# Patient Record
Sex: MALE | NOT HISPANIC OR LATINO | Employment: UNEMPLOYED | URBAN - METROPOLITAN AREA
[De-identification: names, ages, dates, MRNs, and addresses within clinical notes are randomized per-mention and may not be internally consistent; named-entity substitution may affect disease eponyms.]

---
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
## 2023-03-19 ENCOUNTER — OFFICE VISIT (OUTPATIENT)
Dept: URGENT CARE | Facility: URGENT CARE | Age: 18
End: 2023-03-19
Payer: COMMERCIAL

## 2023-03-19 VITALS
DIASTOLIC BLOOD PRESSURE: 80 MMHG | WEIGHT: 235 LBS | OXYGEN SATURATION: 97 % | HEART RATE: 85 BPM | SYSTOLIC BLOOD PRESSURE: 131 MMHG | TEMPERATURE: 98.3 F

## 2023-03-19 DIAGNOSIS — B27.90 INFECTIOUS MONONUCLEOSIS WITHOUT COMPLICATION, INFECTIOUS MONONUCLEOSIS DUE TO UNSPECIFIED ORGANISM: Primary | ICD-10-CM

## 2023-03-19 DIAGNOSIS — J03.90 EXUDATIVE TONSILLITIS: ICD-10-CM

## 2023-03-19 DIAGNOSIS — R07.0 THROAT PAIN: ICD-10-CM

## 2023-03-19 LAB
DEPRECATED S PYO AG THROAT QL EIA: NEGATIVE
MONOCYTES NFR BLD AUTO: POSITIVE %

## 2023-03-19 PROCEDURE — 36415 COLL VENOUS BLD VENIPUNCTURE: CPT | Performed by: INTERNAL MEDICINE

## 2023-03-19 PROCEDURE — 87651 STREP A DNA AMP PROBE: CPT | Performed by: INTERNAL MEDICINE

## 2023-03-19 PROCEDURE — 99204 OFFICE O/P NEW MOD 45 MIN: CPT | Performed by: INTERNAL MEDICINE

## 2023-03-19 PROCEDURE — 86308 HETEROPHILE ANTIBODY SCREEN: CPT | Performed by: INTERNAL MEDICINE

## 2023-03-19 ASSESSMENT — ENCOUNTER SYMPTOMS
NAUSEA: 1
FEVER: 0
DIAPHORESIS: 0
COUGH: 0
CHILLS: 0
RHINORRHEA: 0
MYALGIAS: 0
APPETITE CHANGE: 0

## 2023-03-19 NOTE — PROGRESS NOTES
ASSESSMENT AND PLAN:      ICD-10-CM    1. Infectious mononucleosis without complication, infectious mononucleosis due to unspecified organism  B27.90       2. Throat pain  R07.0 Streptococcus A Rapid Screen w/Reflex to PCR - Clinic Collect     Group A Streptococcus PCR Throat Swab      3. Exudative tonsillitis  J03.90 Mononucleosis screen     Mononucleosis screen          Differential Diagnosis: strep, mono, viral, bacterial    Serious Comorbid Conditions:  Adult:  None    PLAN:    Reviewed handout with patient  Patient Instructions       Fluids  rest    Component      Latest Ref Rng & Units 3/19/2023   Rapid Strep A Screen      Negative Negative   Mononucleosis Screen      Negative Positive (A)       Avoid contact sports for 2 months.    Follow-up with your primary clinic over the next few weeks and with any other concerns.              Batsheva Ochoa MD  [facility]    Subjective     Dewayne Jackson is a 17 year old who presents for Patient presents with:  Urgent Care  Pharyngitis: C/O sore throat for 1 week    a new patient of [facility].      Onset of symptoms was 1-2 week(s) ago.  Current and Associated symptoms: sore throat, hoarse voice and swollen areas in neck  headaches  Harder to inhale with sleeping  Treatment measures tried include gargling  Predisposing factors include ill contact: none.        Review of Systems   Constitutional: Negative for appetite change, chills, diaphoresis and fever.   HENT: Positive for congestion. Negative for rhinorrhea.    Respiratory: Negative for cough (with clearing throat).    Gastrointestinal: Positive for nausea.   Musculoskeletal: Negative for myalgias.           Objective    /80   Pulse 85   Temp 98.3  F (36.8  C) (Tympanic)   Wt 106.6 kg (235 lb)   SpO2 97%   Physical Exam  Vitals reviewed.   Constitutional:       Appearance: Normal appearance.   HENT:      Right Ear: Tympanic membrane normal.      Left Ear: Tympanic membrane  normal.      Mouth/Throat:      Pharynx: Oropharyngeal exudate and posterior oropharyngeal erythema present.   Eyes:      Conjunctiva/sclera: Conjunctivae normal.   Abdominal:      Palpations: Abdomen is soft.      Tenderness: There is no abdominal tenderness.      Comments: NO HSM   Lymphadenopathy:      Cervical: Cervical adenopathy present.   Neurological:      Mental Status: He is alert.            Results for orders placed or performed in visit on 03/19/23 (from the past 24 hour(s))   Streptococcus A Rapid Screen w/Reflex to PCR - Clinic Collect    Specimen: Throat; Swab   Result Value Ref Range    Group A Strep antigen Negative Negative   Mononucleosis screen   Result Value Ref Range    Mononucleosis Screen Positive (A) Negative

## 2023-03-19 NOTE — PATIENT INSTRUCTIONS
Fluids  rest    Component      Latest Ref Rng & Units 3/19/2023   Rapid Strep A Screen      Negative Negative   Mononucleosis Screen      Negative Positive (A)       Avoid contact sports for 2 months.    Follow-up with your primary clinic over the next few weeks and with any other concerns.

## 2023-03-20 LAB — GROUP A STREP BY PCR: NOT DETECTED

## 2025-02-20 ENCOUNTER — HOSPITAL ENCOUNTER (EMERGENCY)
Facility: CLINIC | Age: 20
Discharge: HOME | End: 2025-02-20
Attending: EMERGENCY MEDICINE | Admitting: EMERGENCY MEDICINE
Payer: COMMERCIAL

## 2025-02-20 VITALS
TEMPERATURE: 98.1 F | SYSTOLIC BLOOD PRESSURE: 130 MMHG | OXYGEN SATURATION: 97 % | DIASTOLIC BLOOD PRESSURE: 92 MMHG | HEART RATE: 126 BPM | RESPIRATION RATE: 16 BRPM

## 2025-02-20 DIAGNOSIS — T78.2XXA ANAPHYLAXIS, INITIAL ENCOUNTER: ICD-10-CM

## 2025-02-20 LAB
HOLD SPECIMEN: NORMAL
HOLD SPECIMEN: NORMAL

## 2025-02-20 PROCEDURE — 96372 THER/PROPH/DIAG INJ SC/IM: CPT | Performed by: EMERGENCY MEDICINE

## 2025-02-20 PROCEDURE — 250N000009 HC RX 250: Performed by: EMERGENCY MEDICINE

## 2025-02-20 PROCEDURE — 99291 CRITICAL CARE FIRST HOUR: CPT | Performed by: EMERGENCY MEDICINE

## 2025-02-20 PROCEDURE — 250N000011 HC RX IP 250 OP 636: Mod: JZ | Performed by: EMERGENCY MEDICINE

## 2025-02-20 PROCEDURE — 96375 TX/PRO/DX INJ NEW DRUG ADDON: CPT | Performed by: EMERGENCY MEDICINE

## 2025-02-20 PROCEDURE — 99291 CRITICAL CARE FIRST HOUR: CPT | Mod: 25 | Performed by: EMERGENCY MEDICINE

## 2025-02-20 PROCEDURE — 94640 AIRWAY INHALATION TREATMENT: CPT | Performed by: EMERGENCY MEDICINE

## 2025-02-20 PROCEDURE — 96374 THER/PROPH/DIAG INJ IV PUSH: CPT | Performed by: EMERGENCY MEDICINE

## 2025-02-20 RX ORDER — EPINEPHRINE 0.1 MG/ML
INJECTION INTRAVENOUS
Status: DISCONTINUED
Start: 2025-02-20 | End: 2025-02-20 | Stop reason: WASHOUT

## 2025-02-20 RX ORDER — ALBUTEROL SULFATE 90 UG/1
2 INHALANT RESPIRATORY (INHALATION) EVERY 6 HOURS
Qty: 6.7 G | Refills: 0 | Status: SHIPPED | OUTPATIENT
Start: 2025-02-20 | End: 2025-03-02

## 2025-02-20 RX ORDER — EPINEPHRINE 0.3 MG/.3ML
0.3 INJECTION SUBCUTANEOUS PRN
Qty: 2 EACH | Refills: 3 | Status: SHIPPED | OUTPATIENT
Start: 2025-02-20

## 2025-02-20 RX ORDER — PREDNISONE 20 MG/1
TABLET ORAL
Qty: 10 TABLET | Refills: 0 | Status: SHIPPED | OUTPATIENT
Start: 2025-02-20

## 2025-02-20 RX ORDER — ALBUTEROL SULFATE 0.83 MG/ML
2.5 SOLUTION RESPIRATORY (INHALATION) ONCE
Status: COMPLETED | OUTPATIENT
Start: 2025-02-20 | End: 2025-02-20

## 2025-02-20 RX ORDER — DIPHENHYDRAMINE HYDROCHLORIDE 50 MG/ML
50 INJECTION INTRAMUSCULAR; INTRAVENOUS ONCE
Status: COMPLETED | OUTPATIENT
Start: 2025-02-20 | End: 2025-02-20

## 2025-02-20 RX ORDER — METHYLPREDNISOLONE SODIUM SUCCINATE 125 MG/2ML
125 INJECTION INTRAMUSCULAR; INTRAVENOUS ONCE
Status: COMPLETED | OUTPATIENT
Start: 2025-02-20 | End: 2025-02-20

## 2025-02-20 RX ADMIN — ALBUTEROL SULFATE 2.5 MG: 2.5 SOLUTION RESPIRATORY (INHALATION) at 18:01

## 2025-02-20 RX ADMIN — EPINEPHRINE 0.3 MG: 1 INJECTION INTRAMUSCULAR; INTRAVENOUS; SUBCUTANEOUS at 17:52

## 2025-02-20 RX ADMIN — DIPHENHYDRAMINE HYDROCHLORIDE 50 MG: 50 INJECTION, SOLUTION INTRAMUSCULAR; INTRAVENOUS at 17:56

## 2025-02-20 RX ADMIN — METHYLPREDNISOLONE SODIUM SUCCINATE 125 MG: 125 INJECTION, POWDER, FOR SOLUTION INTRAMUSCULAR; INTRAVENOUS at 18:01

## 2025-02-20 RX ADMIN — FAMOTIDINE 40 MG: 10 INJECTION, SOLUTION INTRAVENOUS at 17:57

## 2025-02-20 ASSESSMENT — ACTIVITIES OF DAILY LIVING (ADL)
ADLS_ACUITY_SCORE: 41

## 2025-02-20 NOTE — ED TRIAGE NOTES
Pt here for allergic reaction.  Pt is allergic to eggs and tree nuts.    Pt consumed candy that had eggs in it.  Started having SOB and hives all over body.    They initially called EMS but they didn't do anything for him.

## 2025-02-21 NOTE — ED PROVIDER NOTES
ED Provider Note  [facility]      History     Chief Complaint   Patient presents with    Allergic Reaction     HPI  Dewayne Jackson is a 19 year old male who presents to the ER for evaluation of allergic reaction.    Patient has a known anaphylaxis rxn to eggs.  Patient reports that he ate a candy prior to arrival that contained egg whites he did not realize.  He started with itchy urticarial rash, vomited over 6 times.  He states now he feels tight in his chest and throat with wheezing.  He is supposed to have epinephrine but has not had a reaction in over 8 years did not carry epinephrine with him today.  Patient reports he was feeling prior to the exposure today      Past Medical History  No past medical history on file.  No past surgical history on file.  EPINEPHrine (ANY BX GENERIC EQUIV) 0.3 MG/0.3ML injection 2-pack  predniSONE (DELTASONE) 20 MG tablet  albuterol (PROAIR HFA) 108 (90 Base) MCG/ACT inhaler      Allergies   Allergen Reactions    Other Food Allergy      Egg    Trees      Cashews     Family History  No family history on file.  Social History   Social History     Tobacco Use    Smoking status: Never    Smokeless tobacco: Never      A medically appropriate review of systems was performed with pertinent positives and negatives noted in the HPI, and all other systems negative.    Physical Exam   BP: (!) 124/105 (Simultaneous filing. User may not have seen previous data.)  Pulse: (!) 152  Temp: 98.1  F (36.7  C)  Resp: 18  SpO2: 95 %  Physical Exam  General: awake, alert, appears in mild/moderate distress  Head: normal cephalic  HEENT: pupils equal, conjugate gaze intact.  Patient has some facial swelling noted but no swelling of the lips or tongue.  Posterior oropharynx notable for some swelling at the base of the uvula.  Tolerating secretions.  Neck: Supple, no stridor  CV: Tachycardic rate, no murmur  Lungs: Diffuse inspiratory and expiratory wheezing  Abd: soft,  non-tender, no guarding, no peritoneal signs  Neuro: awake, answers questions appropriately. No focal deficits noted   Skin: Diffuse urticarial rash    ED Course, Procedures, & Data      Procedures          Results for orders placed or performed during the hospital encounter of 02/20/25   [place] Draw     Status: None    Narrative    The following orders were created for panel order [place] Draw.  Procedure                               Abnormality         Status                     ---------                               -----------         ------                     Extra Green Top (Lithium...[[id number]]                      Final result               Extra Purple Top Tube[[id number]]                            Final result                 Please view results for these tests on the individual orders.   Extra Green Top (Lithium Heparin) Tube     Status: None   Result Value Ref Range    Hold Specimen JIC    Extra Purple Top Tube     Status: None   Result Value Ref Range    Hold Specimen JIC      Medications   EPINEPHrine (ADRENALIN) 1 MG/ML kit (  Canceled Entry 2/20/25 1752)   EPINEPHrine (ADRENALIN) kit 0.3 mg (0.3 mg Subcutaneous $Given 2/20/25 1752)   diphenhydrAMINE (BENADRYL) injection 50 mg (50 mg Intravenous $Given 2/20/25 1756)   famotidine (PEPCID) injection 40 mg (40 mg Intravenous $Given 2/20/25 1757)   albuterol (PROVENTIL) neb solution 2.5 mg (2.5 mg Nebulization $Given 2/20/25 1801)   methylPREDNISolone Na Suc (solu-MEDROL) injection 125 mg (125 mg Intravenous $Given 2/20/25 1801)     Labs Ordered and Resulted from Time of ED Arrival to Time of ED Departure - No data to display  No orders to display          Critical Care Addendum  My initial assessment, based on my review of nursing observations, review of vital signs, focused history, and physical exam, established a high suspicion that Dewayne Jackson has  anaphylaxis , which requires immediate intervention, and therefore he is critically ill.      After the initial assessment, the care team initiated medication therapy with epinephrine, famotidine Benadryl Solu-Medrol  to provide stabilization care. Due to the critical nature of this patient, I reassessed vital signs, physical exam, and respiratory status multiple times prior to his disposition.     Time also spent performing documentation.     Critical care time (excluding teaching time and procedures): 45 minutes.       Assessment & Plan    On presentation Dewayne is tachycardic, has diffuse wheezing, facial swelling, hives and congested voice.  He is also endorsing feels tightness in his chest and throat.  He was immediately given 0.3 IM epinephrine, followed by Solu-Medrol, famotidine, and Benadryl and albuterol nebulizer.  Within several minutes of epinephrine his heart rate improved; on reassessment his labs were improved his lungs were clear his vital signs normalized and rash significantly improved.    Patient be monitored for 4.5 hours to rule out biphasic reaction.    On repeat assessment patient was much better appearing.  Lungs were clear heart rate normalized satting 98% on room air patient. Had no swelling with airway.  He felt back to baseline and is requesting discharge.  Counseled him on biphasic reaction.  Will discharge home with an epinephrine prescription which she will fill on the way home tonight should he have recurrent symptoms he should administer epi urinary return to the ER.  Discussed using steroid burst and sertraline for the next 2 weeks.    At the time of discharge patient understood need to fill Ozempic prescription and keep his EpiPen on him at all times.  He had no signs or symptoms of anaphylaxis and a normal physical exam.    Patient notes that he also has seasonal allergies and occasionally wheezes at baseline is requesting an albuterol inhaler prescription    I have reviewed the nursing notes. I have reviewed the findings, diagnosis, plan and need for follow up with  the patient.    New Prescriptions    ALBUTEROL (PROAIR HFA) 108 (90 BASE) MCG/ACT INHALER    Inhale 2 puffs into the lungs every 6 hours for 10 days.    EPINEPHRINE (ANY BX GENERIC EQUIV) 0.3 MG/0.3ML INJECTION 2-PACK    Inject 0.3 mLs (0.3 mg) into the muscle as needed for anaphylaxis. May repeat one time in 5-15 minutes if response to initial dose is inadequate.    PREDNISONE (DELTASONE) 20 MG TABLET    Take two tablets (= 40mg) each day for 5 (five) days       Final diagnoses:   Anaphylaxis, initial encounter       Howard Rolon MD  [facility] EMERGENCY DEPARTMENT  2/20/2025     Howard Rolon MD  02/20/25 [id number]

## 2025-02-21 NOTE — DISCHARGE INSTRUCTIONS
Please monitor your symptoms carefully if you have any recurrent symptoms suggestive of anaphylaxis please administer your EpiPen and return to the ER for repeat evaluation.    Please make sure that you carry your EpiPen on you at all times.    Your immune system may be particularly activated from neck several weeks to recommend taking an over-the-counter nonsedating antihistamine like Claritin for 2 weeks and complete the steroids that were prescribed.